# Patient Record
Sex: FEMALE | Race: WHITE | NOT HISPANIC OR LATINO | Employment: UNEMPLOYED | URBAN - METROPOLITAN AREA
[De-identification: names, ages, dates, MRNs, and addresses within clinical notes are randomized per-mention and may not be internally consistent; named-entity substitution may affect disease eponyms.]

---

## 2021-10-19 PROBLEM — Z80.3 FAMILY HISTORY OF BREAST CANCER IN FEMALE: Status: ACTIVE | Noted: 2021-10-19

## 2022-08-01 ENCOUNTER — CLINICAL SUPPORT (OUTPATIENT)
Dept: AUDIOLOGY | Facility: CLINIC | Age: 77
End: 2022-08-01
Payer: MEDICARE

## 2022-08-01 ENCOUNTER — OFFICE VISIT (OUTPATIENT)
Dept: OTOLARYNGOLOGY | Facility: CLINIC | Age: 77
End: 2022-08-01
Payer: MEDICARE

## 2022-08-01 VITALS — TEMPERATURE: 98 F | HEART RATE: 70 BPM | DIASTOLIC BLOOD PRESSURE: 84 MMHG | SYSTOLIC BLOOD PRESSURE: 125 MMHG

## 2022-08-01 DIAGNOSIS — H90.12 CONDUCTIVE HEARING LOSS OF LEFT EAR WITH UNRESTRICTED HEARING OF RIGHT EAR: Primary | ICD-10-CM

## 2022-08-01 DIAGNOSIS — H90.12 CONDUCTIVE HEARING LOSS OF LEFT EAR WITH UNRESTRICTED HEARING OF RIGHT EAR: ICD-10-CM

## 2022-08-01 DIAGNOSIS — H69.92 DYSFUNCTION OF LEFT EUSTACHIAN TUBE: ICD-10-CM

## 2022-08-01 DIAGNOSIS — R42 DIZZINESS: ICD-10-CM

## 2022-08-01 PROCEDURE — 99203 PR OFFICE/OUTPT VISIT, NEW, LEVL III, 30-44 MIN: ICD-10-PCS | Mod: S$PBB,,, | Performed by: OTOLARYNGOLOGY

## 2022-08-01 PROCEDURE — 99999 PR PBB SHADOW E&M-EST. PATIENT-LVL IV: CPT | Mod: PBBFAC,,, | Performed by: OTOLARYNGOLOGY

## 2022-08-01 PROCEDURE — 92553 AUDIOMETRY AIR & BONE: CPT | Mod: PBBFAC | Performed by: AUDIOLOGIST-HEARING AID FITTER

## 2022-08-01 PROCEDURE — 99999 PR PBB SHADOW E&M-EST. PATIENT-LVL I: ICD-10-PCS | Mod: PBBFAC,,, | Performed by: AUDIOLOGIST-HEARING AID FITTER

## 2022-08-01 PROCEDURE — 99999 PR PBB SHADOW E&M-EST. PATIENT-LVL IV: ICD-10-PCS | Mod: PBBFAC,,, | Performed by: OTOLARYNGOLOGY

## 2022-08-01 PROCEDURE — 99211 OFF/OP EST MAY X REQ PHY/QHP: CPT | Mod: PBBFAC,27 | Performed by: AUDIOLOGIST-HEARING AID FITTER

## 2022-08-01 PROCEDURE — 99214 OFFICE O/P EST MOD 30 MIN: CPT | Mod: PBBFAC,25 | Performed by: OTOLARYNGOLOGY

## 2022-08-01 PROCEDURE — 99999 PR PBB SHADOW E&M-EST. PATIENT-LVL I: CPT | Mod: PBBFAC,,, | Performed by: AUDIOLOGIST-HEARING AID FITTER

## 2022-08-01 PROCEDURE — 92567 TYMPANOMETRY: CPT | Mod: PBBFAC | Performed by: AUDIOLOGIST-HEARING AID FITTER

## 2022-08-01 PROCEDURE — 99203 OFFICE O/P NEW LOW 30 MIN: CPT | Mod: S$PBB,,, | Performed by: OTOLARYNGOLOGY

## 2022-08-01 NOTE — PROGRESS NOTES
Referring Provider:    Aaareferral Self  No address on file  Subjective:   Patient: Mallory Castro [id number], :1945   Visit date:2022 11:07 AM    Chief Complaint:  Dizziness (Onset late February, comes and goes intensity ebbs and flows )    HPI:    Prior notes reviewed by myself.  Clinical documentation obtained by nursing staff reviewed.     76-year-old female presents for evaluation of dizziness.  She has been having episodes intermittently since February, mostly when she turns her head rapidly, she describes these episodes as spinning vertigo that lasts for less than 15-20 seconds.  She has not noticed any significant changes in her hearing although she does have frequent ear popping on the left side.  She denies any tinnitus.  She does not have any other significant otologic history.  She did have an audiogram today      Objective:     Physical Exam:  Vitals:  /84   Pulse 70   Temp 97.5 °F (36.4 °C) (Temporal)   General appearance:  Well developed, well nourished    Ears:  Otoscopy of external auditory canals and tympanic membranes was normal, clinical speech reception thresholds grossly intact, no mass/lesion of auricle.    Nose:  No masses/lesions of external nose, nasal mucosa, septum, and turbinates were within normal limits.    Mouth:  No mass/lesion of lips, teeth, gums, hard/soft palate, tongue, tonsils, or oropharynx.    Neck & Lymphatics:  No cervical lymphadenopathy, no neck mass/crepitus/ asymmetry, trachea is midline, no thyroid enlargement/tenderness/mass.    Neuro:  CN II-XII intact bilaterally, negative hallpike bilaterally        [x]  Data Reviewed:    Lab Results   Component Value Date    WBC 6.26 2013    HGB 14.8 2013    HCT 43.4 2013    MCV 90 2013    EOSINOPHIL 1.1 2013           Assessment & Plan:   Conductive hearing loss of left ear with unrestricted hearing of right ear  -     Ambulatory referral/consult to Audiology; Future; Expected  date: 08/08/2022    Dizziness  -     Ambulatory referral/consult to Physical/Occupational Therapy; Future; Expected date: 08/08/2022    Dysfunction of left eustachian tube        Her Hallpike test was negative in clinic today, but her history is extremely suspicious for BP.  She had an episode as recent as yesterday.  We also discussed her audiogram in detail and her eustachian tube dysfunction.  We agreed on a referral to vestibular therapy for her recurrent dizziness.    Conservative Treatment of Eustachian Tube Dysfunction    Your physician has diagnosed you with eustachian tube dysfunction.  There are several conservative medical treatments that have been shown to help improve the function of your eustachian tube.    1. Topical Decongestant Spray (Afrin):  2 sprays in each nostril twice daily for 3 days.  It is important to stop this medication after 3 days as it can be habit-forming.  Buy the Afrin pump spray mist.  This usually comes in a red and white box over the counter.  2. Autoinsufflation (popping the ears):  Recommend gentle popping of the ears 3-4 times per day and as needed for symptom relief.  3. Oral Steroids:  Your physician may or may not recommend a short course of oral steroids.  If so, take as directed.  These help decrease the inflammation in your nasal/sinus cavity as well as around your eustachian tube which may help with your symptoms  4. Nasal Steroids:  Most commonly fluticasone 2 sprays in each nostril once daily.  This will help decrease the inflammation in your nasal/sinus cavity as well as around your eustachian tube which may help with your symptoms    If no improvement after 2 weeks of conservative treatment, your physician may recommend a myringotomy and or ear tube placement for symptom relief.

## 2022-08-01 NOTE — PROGRESS NOTES
Referring Provider:MD Sundeep    Mallory Castro was seen 08/01/2022 for an audiological evaluation.  She has been having episodes intermittently since February, mostly when she turns her head rapidly, she describes these episodes as spinning vertigo that lasts for less than 15-20 seconds.  She has not noticed any significant changes in her hearing although she does have frequent ear popping on the left side.  She denies any tinnitus.  She does not have any other significant otologic history.     Results reveal normal hearing from 250-8000 Hz for the right ear, and a mild conductive hearing loss 250-8000 Hz for the left ear.  Tympanograms were Type A, normal for the right ear and Type C, abnormal for the left ear.    Patient was counseled on the above findings.    Recommendations:  1. ENT review  2. Annual Audiograms    Boomer Hallpike Negative AU        Recommendations:  1. VNG

## 2022-08-12 ENCOUNTER — CLINICAL SUPPORT (OUTPATIENT)
Dept: REHABILITATION | Facility: HOSPITAL | Age: 77
End: 2022-08-12
Attending: OTOLARYNGOLOGY
Payer: MEDICARE

## 2022-08-12 DIAGNOSIS — R42 DIZZINESS: ICD-10-CM

## 2022-08-12 PROCEDURE — 95992 CANALITH REPOSITIONING PROC: CPT

## 2022-08-12 PROCEDURE — 97161 PT EVAL LOW COMPLEX 20 MIN: CPT

## 2022-08-12 NOTE — PLAN OF CARE
[facility]   Physical Therapy Initial Evaluation     Date: 8/12/2022   Name: Mallory Castro  Clinic Number: [id number]    Therapy Diagnosis:   Encounter Diagnosis   Name Primary?    Dizziness      Physician: Olivier Urbano MD    Physician Orders: PT Eval and Treat   Medical Diagnosis from Referral: Dizziness  Evaluation Date: 8/12/2022  Authorization Period Expiration: 8/1/2023  Plan of Care Expiration: 10/20/2022  Progress Note Due: 10th visit  Visit # / Visits authorized: 1/ 20   FOTO: 1/ 3     Precautions: Standard and Fall    Time In: 9:45  Time Out: 10:40  Total Appointment Time (timed & untimed codes): 45 minutes    SUBJECTIVE   Date of onset: February/March 2022 on/off episodes     History of current condition - Mallory reports:     She has been having episodes intermittently since February, mostly when she turns her head rapidly, she describes these episodes as spinning vertigo that lasts for less than 15-20 seconds.       Falls: multiple falls in the past 6 months    Imaging, VNG: neg Gayle Hallpike    Prior Therapy: yes: ortho  Social History:  lives with their spouse  Occupation: not working  Prior Level of Function: mod I  Current Level of Function: mod I    Dizziness:  Current 4/10, worst 10/10, best 1/10   Description: room spinning and unsteadiness  Aggravating Factors: Bending, Lifting and Getting out of bed/chair  Easing Factors: relaxation    Patients goals: decreased dizziness     Medical History:   Past Medical History:   Diagnosis Date    Allergy     Anxiety     Arthritis     Cataract     Dry eyes     Dry mouth     Endometriosis     Family history of breast cancer 02/19/2016    Fibromyalgia 2000    Genetic testing 06/08/2017    BRAC NEGATIVE  TCRA 6.6 % if menopausal at 44 8.1% if at 53 yo.    Herpes     Hyperlipidemia     Hypertension     Hypothyroidism     Neuromuscular disorder     Osteoarthritis 2003    Osteoporosis     Sepsis 2012    Thyroid  disease        Surgical History:   Mallory Castro  has a past surgical history that includes Joint replacement (08/2013); Appendectomy; Cholecystectomy; Foot surgery; left ankle surgery; Bunionectomy (Right, 2007); Cataract extraction, bilateral; Colonoscopy; Wrist surgery (2006); Hand tendon surgery (2014); Total hip arthroplasty; Sinus surgery (1978); Shoulder surgery; Hysterectomy (1987); and Breast cyst aspiration (Left, 1978).    Medications:   Mallory has a current medication list which includes the following prescription(s): acetaminophen, acyclovir, amlodipine, aspirin, b complex vitamins, clonazepam, ergocalciferol, estradiol 0.05 mg/24 hr td ptsw, levothyroxine, meloxicam, metformin, metoprolol tartrate, sertraline, trazodone, trazodone, and triamterene-hydrochlorothiazide 37.5-25 mg.    Allergies:   Review of patient's allergies indicates:   Allergen Reactions    Amitriptyline      Other reaction(s): nightmares    Codeine      Other reaction(s): Stomach upset    Codeine phosphate     Prednisone      Other reaction(s): mental unstability          OBJECTIVE         CMS Impairment/Limitation/Restriction for FOTO Vertigo Survey    Therapist reviewed FOTO scores for Mallory Castro on 8/12/2022.   FOTO documents entered into [employer] - see Media section.    Limitation Score: 52%            Vestibular Testing  Visual Fields: NT  Horizontal tracking: abnormal, hypofunction poor accuracy s/s  Vertical Tracking: abnormal, hypofunction poor accuracy s/s  Diagonal tracking:  Abnormal, hypofunction poor accuracy s/s  Lateral Visual acuity: NT  Dynamic Visual Acuity: NT  Vestibular Occular Reflex   Vertical: abnormal , hypofunction s/s   Horizontal: abnormal, hypofunction s/s  Convergence: abnormal (15-20cm)  [place] Halpike:  Neg nystagmus B s/s L epely maneuver  Saccades: abnormal, hypofunction poor speed and accuracy V/H    Vestibular:     Head impulse:  NT Nystagmus   Head shake:  NT nystagmus   Gayle Hallpike  R Neg  nystagmus, + s/s, L neg nystagmus +s/s   Roll test R NT, L NT    Possible cupulithesis    Balance testing: NT--> will test in future visit  Double limb standing eyes open/ eyes closed:   Single limb standing eyes open/ eyes closed:   Double limb standing on foam eyes open/ closed:    Single limb standign on foam eyes open/ closed:   Tandem gait:   Tandem stance:   Gait eyes closed:         TREATMENT     Total Treatment time (time-based codes) separate from Evaluation: 10 minutes      Mallory received the treatments listed below:       NEUROMUSCULAR RE-EDUCATION ACTIVITIES to improve Kinesthetic, Sense, Proprioception and canalith repositioning for 10 minutes.  The following were included: left epley manuever.    PATIENT EDUCATION AND HOME EXERCISES     Education provided:   - yes: VOR, smooth pursuit    Written Home Exercises Provided: yes. Exercises were reviewed and Mallory was able to demonstrate them prior to the end of the session.  Malloyr demonstrated good  understanding of the education provided. See EMR under Patient Instructions for exercises provided during therapy sessions.    ASSESSMENT     Mallory is a 76 y.o. female referred to outpatient Physical Therapy with a medical diagnosis of Vertigo/dizziness, pt presents with signs and symptoms consistent with diagnosis along with impaired balance and convergence insuffiencey . The patient presents with impairments which include weakness, impaired endurance, impaired self care skills, impaired functional mobility, gait instability, impaired balance, abnormal tone and decreased ROM.  These impairments are limiting patient's ability to perform bed mobility and transfers and perform household chores such as cooking and cleaning.    Pt prognosis is Good due to personal factors and co-morbidities listed below.   Pt will benefit from skilled outpatient Physical Therapy to address the deficits stated above and in the chart below, provide pt/family education, and to  maximize pt's level of independence.     Plan of care discussed with patient: Yes  Pt's spiritual, cultural and educational needs considered and patient is agreeable to the plan of care and goals as stated below:     Anticipated Barriers for therapy: transportation     Medical Necessity is demonstrated by the following  History  Co-morbidities and personal factors that may impact the plan of care Co-morbidities:   advanced age, anxiety, depression and difficulty sleeping    Personal Factors:   lifestyle     moderate   Examination  Body Structures and Functions, activity limitations and participation restrictions that may impact the plan of care Body Regions:   head    Body Systems:    balance  gait  joint mobility, muscle tone, muscle length    Participation Restrictions:   See current level of function listed above     Activity limitations:   Learning and applying knowledge  no deficits    General Tasks and Commands  no deficits    Communication  no deficits    Mobility  lifting and carrying objects  walking    Self care  washing oneself (bathing, drying, washing hands)    Domestic Life  shopping  cooking  doing house work (cleaning house, washing dishes, laundry)    Interactions/Relationships  no deficits    Life Areas  no deficits    Community and Social Life  community life  recreation and leisure         low   Clinical Presentation stable and uncomplicated low   Decision Making/ Complexity Score: low     Goals: Reviewed:8/12/2022    Short Term Goals: In 6 weeks   1.Pt to be educated on HEP.  2. Pt to be able to perform bed mobility, such as rolling over in bed without increase in s/s.  3.Patient to have dizziness less than 7/10 at worst, in order to improve QOL.  4.Patient to improve score on the FOTO, in order to improve QOL.       Long Term Goals: In 10 weeks  1. Patient to improve score on the FOTO to 40% or less, in order to improve QOL.  2. Patient to perform daily activities including bending to the floor  and reaching to a high cabinet in order to perform household chores without increased symptoms.  3. Patient to have dizziness less than 4/10 at worst, in order to improve QOL.    PLAN     Plan of care Certification: 8/12/2022 to 10/20/2022.    Outpatient Physical Therapy 2 times weekly for 10 weeks to include the following interventions: Cervical/Lumbar Traction, Electrical Stimulation cerviccal, Gait Training, Manual Therapy, Moist Heat/ Ice, Neuromuscular Re-ed, Orthotic Management and Training, Patient Education, Self Care, Therapeutic Activities, Therapeutic Exercise and canalith repositoning.     Alexus Zimmerman, PT      I CERTIFY THE NEED FOR THESE SERVICES FURNISHED UNDER THIS PLAN OF TREATMENT AND WHILE UNDER MY CARE   Physician's comments:     Physician's Signature: ___________________________________________________

## 2022-08-24 ENCOUNTER — CLINICAL SUPPORT (OUTPATIENT)
Dept: REHABILITATION | Facility: HOSPITAL | Age: 77
End: 2022-08-24
Payer: MEDICARE

## 2022-08-24 DIAGNOSIS — R42 DIZZINESS: Primary | ICD-10-CM

## 2022-08-24 PROCEDURE — 95992 CANALITH REPOSITIONING PROC: CPT

## 2022-08-24 PROCEDURE — 97112 NEUROMUSCULAR REEDUCATION: CPT | Mod: 59

## 2022-08-24 NOTE — PROGRESS NOTES
[facility]   Physical Therapy Treatment Note     Name: Mallory Castro  Clinic Number: [id number]    Therapy Diagnosis:   Encounter Diagnosis   Name Primary?    Dizziness Yes     Physician: Olivier Urabno MD    Visit Date: 8/24/2022    [Physician Orders: PT Eval and Treat   Medical Diagnosis from Referral: Dizziness  Evaluation Date: 8/12/2022  Authorization Period Expiration: 8/1/2023  Plan of Care Expiration: 10/20/2022  Progress Note Due: 10th visit  Visit # / Visits authorized: 2/ 20   FOTO: 1/ 3      PTA Visit #: 0/5     Time In: 7:30  Time Out: 8:30  Total Billable Time: 55 minutes    SUBJECTIVE     Pt reports: she remains dizzy with head motions. She felt nauseated last night after looking right to left repeatedly at paperwork.   She was compliant with home exercise program.  Response to previous treatment: good  Functional change: AN    Dizziness: 4/10  Location: bilateral head      OBJECTIVE     Objective Measures updated at progress report unless specified.     Treatment     Mallory received the treatments listed below:      neuromuscular re-education activities to improve: Balance, Coordination, Kinesthetic, Sense, Proprioception, Posture and canalith repositioning for 55 minutes. The following activities were included:  Left Epley Maneuver (02207)  Indication: Dizziness, Positive Gayle-Hallpike Maneuver  Technique: After the procedure was explained at length to the patient and verbal consent was obtained, the patient's head was turned to the left while sitting upright. The patient was then brought to the supine position with head turned to the left and slightly extended, and remained in that position for 1 minute. The patient's head was then turned all the way to the right for 1 minute. At that point the patient was turned to right lateral decubitus position for 1 minute. The patient was then brought back to the upright seated position. The patient tolerated the procedure well,  and was instructed to remain upright for the next 48 hours.   Left BBQ roll for left horizontal canal correction        Patient Education and Home Exercises     Home Exercises Provided and Patient Education Provided     Education provided:   - yes: saccades and smooth pursuit    Written Home Exercises Provided: Patient instructed to cont prior HEP. Exercises were reviewed and Mallory was able to demonstrate them prior to the end of the session.  Mallory demonstrated good  understanding of the education provided. See EMR under Patient Instructions for exercises provided during therapy sessions    ASSESSMENT     Pt tolerated session well with limited tolerance to VRT. Pt did have nausea post head motion and eye motions in horizontal plane. Room spinning dizziness reported with left [place] Hallpike and horizontal canal assessment. Both Epley and BBQ roll completed for posterior and horiz canal correction. Pt may need Libratory maneuver to address possible cupulolithiasis.      Mallory Is progressing well towards her goals.   Pt prognosis is Good.     Pt will continue to benefit from skilled outpatient physical therapy to address the deficits listed in the problem list box on initial evaluation, provide pt/family education and to maximize pt's level of independence in the home and community environment.     Pt's spiritual, cultural and educational needs considered and pt agreeable to plan of care and goals.     Anticipated barriers to physical therapy: none    Goals:    Goals: Reviewed:8/12/2022    Short Term Goals: In 6 weeks   1.Pt to be educated on HEP.  2. Pt to be able to perform bed mobility, such as rolling over in bed without increase in s/s.  3.Patient to have dizziness less than 7/10 at worst, in order to improve QOL.  4.Patient to improve score on the FOTO, in order to improve QOL.         Long Term Goals: In 10 weeks  1. Patient to improve score on the FOTO to 40% or less, in order to improve QOL.  2. Patient to  perform daily activities including bending to the floor and reaching to a high cabinet in order to perform household chores without increased symptoms.  3. Patient to have dizziness less than 4/10 at worst, in order to improve QOL.     PLAN      Plan of care Certification: 8/12/2022 to 10/20/2022.     Outpatient Physical Therapy 2 times weekly for 10 weeks to include the following interventions: Cervical/Lumbar Traction, Electrical Stimulation cerviccal, Gait Training, Manual Therapy, Moist Heat/ Ice, Neuromuscular Re-ed, Orthotic Management and Training, Patient Education, Self Care, Therapeutic Activities, Therapeutic Exercise and canalith repositoning.                Alexus Zimmerman, PT

## 2022-08-29 ENCOUNTER — CLINICAL SUPPORT (OUTPATIENT)
Dept: REHABILITATION | Facility: HOSPITAL | Age: 77
End: 2022-08-29
Payer: MEDICARE

## 2022-08-29 DIAGNOSIS — R42 DIZZINESS: Primary | ICD-10-CM

## 2022-08-29 PROCEDURE — 97112 NEUROMUSCULAR REEDUCATION: CPT

## 2022-08-29 NOTE — PROGRESS NOTES
"[facility]   Physical Therapy Treatment Note     Name: Mallory Castro  Clinic Number: [id number]    Therapy Diagnosis:   Encounter Diagnosis   Name Primary?    Dizziness Yes     Physician: Olivier Urbano MD    Visit Date: 8/29/2022    [Physician Orders: PT Eval and Treat   Medical Diagnosis from Referral: Dizziness  Evaluation Date: 8/12/2022  Authorization Period Expiration: 8/1/2023  Plan of Care Expiration: 10/20/2022  Progress Note Due: 10th visit  Visit # / Visits authorized: 3/ 20   FOTO: 1/ 3      PTA Visit #: 0/5     Time In: 9:00  Time Out: 9:45  Total Billable Time: 45 minutes    SUBJECTIVE     Pt reports: she remains dizzy with head motions. She reports falling into closet after looking upward at high shelf for awhile.   She was compliant with home exercise program.  Response to previous treatment: good  Functional change: AN    Dizziness: 4/10  Location: bilateral head      OBJECTIVE     Objective Measures updated at progress report unless specified.     Treatment     Mallory received the treatments listed below:      neuromuscular re-education activities to improve: Balance, Coordination, Kinesthetic, Sense, Proprioception, Posture and canalith repositioning for 45 minutes. The following activities were included:  Seated and standing x1 eye ex 20" 3x ea  Smooth pursuit seated and standing 2x30"  Seated vor cancellation 1x10  Ankle st at wall  Heel rocks  Reverse stepping for fall prevention   EC static 30"  EC with small perturbations by PT for reverse step strategy  Standing head motion 1x10 ea way 7/10 dizziness  Gayle hallpike: neg  Horiz canal: neg      Patient Education and Home Exercises     Home Exercises Provided and Patient Education Provided     Education provided:   - yes: saccades and smooth pursuit    Written Home Exercises Provided: Patient instructed to cont prior HEP. Exercises were reviewed and Mallory was able to demonstrate them prior to the end of the session. "  Mallory demonstrated good  understanding of the education provided. See EMR under Patient Instructions for exercises provided during therapy sessions    ASSESSMENT     Pt tolerated session well with improved tolerance to VRT. Pt did have nausea post head motion and eye motions with vertical head motion especially with cervical extension. Instructed pt in seated x1 ex with fingertip for compliance and initiated VOR cancellation with cervical extension. Poor ankle strategy and limited reaction with posterior lean causing fall and LOB in backward direction. Instructed pt in reverse stepping  to prevent LOB.     Mallory Is progressing well towards her goals.   Pt prognosis is Good.     Pt will continue to benefit from skilled outpatient physical therapy to address the deficits listed in the problem list box on initial evaluation, provide pt/family education and to maximize pt's level of independence in the home and community environment.     Pt's spiritual, cultural and educational needs considered and pt agreeable to plan of care and goals.     Anticipated barriers to physical therapy: none    Goals:    Goals: Reviewed:8/12/2022    Short Term Goals: In 6 weeks   1.Pt to be educated on HEP.  2. Pt to be able to perform bed mobility, such as rolling over in bed without increase in s/s.  3.Patient to have dizziness less than 7/10 at worst, in order to improve QOL.  4.Patient to improve score on the FOTO, in order to improve QOL.         Long Term Goals: In 10 weeks  1. Patient to improve score on the FOTO to 40% or less, in order to improve QOL.  2. Patient to perform daily activities including bending to the floor and reaching to a high cabinet in order to perform household chores without increased symptoms.  3. Patient to have dizziness less than 4/10 at worst, in order to improve QOL.     PLAN      Plan of care Certification: 8/12/2022 to 10/20/2022.     Outpatient Physical Therapy 2 times weekly for 10 weeks to include  the following interventions: Cervical/Lumbar Traction, Electrical Stimulation cerviccal, Gait Training, Manual Therapy, Moist Heat/ Ice, Neuromuscular Re-ed, Orthotic Management and Training, Patient Education, Self Care, Therapeutic Activities, Therapeutic Exercise and canalith repositoning.                Alexus Zimmerman, PT

## 2022-08-31 ENCOUNTER — CLINICAL SUPPORT (OUTPATIENT)
Dept: REHABILITATION | Facility: HOSPITAL | Age: 77
End: 2022-08-31
Payer: MEDICARE

## 2022-08-31 DIAGNOSIS — R42 DIZZINESS: Primary | ICD-10-CM

## 2022-08-31 PROCEDURE — 97112 NEUROMUSCULAR REEDUCATION: CPT

## 2022-08-31 PROCEDURE — 95992 CANALITH REPOSITIONING PROC: CPT

## 2022-08-31 NOTE — PROGRESS NOTES
"[facility]   Physical Therapy Treatment Note     Name: Mallory Castro  Clinic Number: [id number]    Therapy Diagnosis:   Encounter Diagnosis   Name Primary?    Dizziness Yes     Physician: Olivier Urbano MD    Visit Date: 8/31/2022    [Physician Orders: PT Eval and Treat   Medical Diagnosis from Referral: Dizziness  Evaluation Date: 8/12/2022  Authorization Period Expiration: 8/1/2023  Plan of Care Expiration: 10/20/2022  Progress Note Due: 10th visit  Visit # / Visits authorized: 4/ 20   FOTO: 1/ 3      PTA Visit #: 0/5     Time In: 9:00  Time Out: 9:45  Total Billable Time: 45 minutes    SUBJECTIVE     Pt reports: feeling slightly better yesterday but did have one episode after looking upward    She was compliant with home exercise program.  Response to previous treatment: good  Functional change: AN    Dizziness: 4/10  Location: bilateral head      OBJECTIVE     Objective Measures updated at progress report unless specified.     Treatment     Mallory received the treatments listed below:      neuromuscular re-education activities to improve: Balance, Coordination, Kinesthetic, Sense, Proprioception, Posture and canalith repositioning for 45 minutes. The following activities were included:  Seated ax1 eye ex 20" 3x ea  SSeated vor cancellation 1x10  Standing head motion 1x10 ea way 7/10 dizziness  [place] hallpike: right rotatory nystagmus   Horiz canal: right libratory       Patient Education and Home Exercises     Home Exercises Provided and Patient Education Provided     Education provided:   - yes: saccades and smooth pursuit    Written Home Exercises Provided: Patient instructed to cont prior HEP. Exercises were reviewed and Mallory was able to demonstrate them prior to the end of the session.  Mallory demonstrated good  understanding of the education provided. See EMR under Patient Instructions for exercises provided during therapy sessions    ASSESSMENT     Pt tolerated session well " with improved tolerance to VRT. Canal assessment completed with notable right rotatory nystagmus during [place] Hallpike. Right Libratory maneuver performed to address possible cupulolithiasis.    Poor ankle strategy and limited reaction with posterior lean causing fall and LOB in backward direction. Instructed pt in reverse stepping  to prevent LOB.     Mallory Is progressing well towards her goals.   Pt prognosis is Good.     Pt will continue to benefit from skilled outpatient physical therapy to address the deficits listed in the problem list box on initial evaluation, provide pt/family education and to maximize pt's level of independence in the home and community environment.     Pt's spiritual, cultural and educational needs considered and pt agreeable to plan of care and goals.     Anticipated barriers to physical therapy: none    Goals:    Goals: Reviewed:8/12/2022    Short Term Goals: In 6 weeks   1.Pt to be educated on HEP.  2. Pt to be able to perform bed mobility, such as rolling over in bed without increase in s/s.  3.Patient to have dizziness less than 7/10 at worst, in order to improve QOL.  4.Patient to improve score on the FOTO, in order to improve QOL.         Long Term Goals: In 10 weeks  1. Patient to improve score on the FOTO to 40% or less, in order to improve QOL.  2. Patient to perform daily activities including bending to the floor and reaching to a high cabinet in order to perform household chores without increased symptoms.  3. Patient to have dizziness less than 4/10 at worst, in order to improve QOL.     PLAN      Plan of care Certification: 8/12/2022 to 10/20/2022.     Outpatient Physical Therapy 2 times weekly for 10 weeks to include the following interventions: Cervical/Lumbar Traction, Electrical Stimulation cerviccal, Gait Training, Manual Therapy, Moist Heat/ Ice, Neuromuscular Re-ed, Orthotic Management and Training, Patient Education, Self Care, Therapeutic Activities, Therapeutic  Exercise and canalith repositoning.                Alexus Zimmerman, PT

## 2022-09-06 ENCOUNTER — CLINICAL SUPPORT (OUTPATIENT)
Dept: REHABILITATION | Facility: HOSPITAL | Age: 77
End: 2022-09-06
Payer: MEDICARE

## 2022-09-06 DIAGNOSIS — R42 DIZZINESS: Primary | ICD-10-CM

## 2022-09-06 PROCEDURE — 97112 NEUROMUSCULAR REEDUCATION: CPT

## 2022-09-06 NOTE — PROGRESS NOTES
"[facility]   Physical Therapy Treatment Note     Name: Mallory Castro  Clinic Number: [id number]    Therapy Diagnosis:   Encounter Diagnosis   Name Primary?    Dizziness Yes     Physician: Olivier Urbano MD    Visit Date: 9/6/2022    [Physician Orders: PT Eval and Treat   Medical Diagnosis from Referral: Dizziness  Evaluation Date: 8/12/2022  Authorization Period Expiration: 8/1/2023  Plan of Care Expiration: 10/20/2022  Progress Note Due: 10th visit  Visit # / Visits authorized: 5/ 20   FOTO: 1/ 3      PTA Visit #: 0/5     Time In: 9:30  Time Out: 10:15  Total Billable Time: 45 minutes    SUBJECTIVE     Pt reports: feeling better today  She was compliant with home exercise program.  Response to previous treatment: good  Functional change: AN    Dizziness: 4/10  Location: bilateral head      OBJECTIVE     Objective Measures updated at progress report unless specified.     Treatment     Mallory received the treatments listed below:      neuromuscular re-education activities to improve: Balance, Coordination, Kinesthetic, Sense, Proprioception, Posture and canalith repositioning for 45 minutes. The following activities were included:  Seated 1 eye ex 20" 3x ea  SSeated vor cancellation 1x10  Heel rocks  Standing hip width stance with head turns horiz and vertical 20x  Head tilts 1x10, EC head tilts 1x10  Amb with quick head turns 2 laps  Amb with slow head turn full ROM 1 lap  Amb 3 step seq 2 laps  Reverse stepping for fall prevention   EC static 30"  EC with small perturbations by PT for reverse step strategy  Standing head motion 1x10 ea way 7/10 dizziness      Patient Education and Home Exercises     Home Exercises Provided and Patient Education Provided     Education provided:   - yes: saccades and smooth pursuit    Written Home Exercises Provided: Patient instructed to cont prior HEP. Exercises were reviewed and Mallory was able to demonstrate them prior to the end of the session.  " Mallory demonstrated good  understanding of the education provided. See EMR under Patient Instructions for exercises provided during therapy sessions    ASSESSMENT     Pt tolerated session well with improved tolerance to VRT. Initiated head nods and turns without stabilization. Poor ankle strategy and limited reaction with posterior lean causing fall and LOB in backward direction. Advanced to eyes closed with ankle strategy and reverse step strategy with limited control. Canal assessment next session.     Mallory Is progressing well towards her goals.   Pt prognosis is Good.     Pt will continue to benefit from skilled outpatient physical therapy to address the deficits listed in the problem list box on initial evaluation, provide pt/family education and to maximize pt's level of independence in the home and community environment.     Pt's spiritual, cultural and educational needs considered and pt agreeable to plan of care and goals.     Anticipated barriers to physical therapy: none    Goals:    Goals: Reviewed:8/12/2022    Short Term Goals: In 6 weeks   1.Pt to be educated on HEP.  2. Pt to be able to perform bed mobility, such as rolling over in bed without increase in s/s.  3.Patient to have dizziness less than 7/10 at worst, in order to improve QOL.  4.Patient to improve score on the FOTO, in order to improve QOL.         Long Term Goals: In 10 weeks  1. Patient to improve score on the FOTO to 40% or less, in order to improve QOL.  2. Patient to perform daily activities including bending to the floor and reaching to a high cabinet in order to perform household chores without increased symptoms.  3. Patient to have dizziness less than 4/10 at worst, in order to improve QOL.     PLAN      Plan of care Certification: 8/12/2022 to 10/20/2022.     Outpatient Physical Therapy 2 times weekly for 10 weeks to include the following interventions: Cervical/Lumbar Traction, Electrical Stimulation cerviccal, Gait Training,  Manual Therapy, Moist Heat/ Ice, Neuromuscular Re-ed, Orthotic Management and Training, Patient Education, Self Care, Therapeutic Activities, Therapeutic Exercise and canalith repositoning.                Alexus Zimmerman, PT

## 2022-09-08 ENCOUNTER — CLINICAL SUPPORT (OUTPATIENT)
Dept: REHABILITATION | Facility: HOSPITAL | Age: 77
End: 2022-09-08
Payer: MEDICARE

## 2022-09-08 DIAGNOSIS — R42 DIZZINESS: Primary | ICD-10-CM

## 2022-09-08 PROCEDURE — 97112 NEUROMUSCULAR REEDUCATION: CPT

## 2022-09-08 NOTE — PROGRESS NOTES
"[facility]   Physical Therapy Treatment Note     Name: Mallory Castro  Clinic Number: [id number]    Therapy Diagnosis:   Encounter Diagnosis   Name Primary?    Dizziness Yes     Physician: Olivier Urbano MD    Visit Date: 9/8/2022    [Physician Orders: PT Eval and Treat   Medical Diagnosis from Referral: Dizziness  Evaluation Date: 8/12/2022  Authorization Period Expiration: 8/1/2023  Plan of Care Expiration: 10/20/2022  Progress Note Due: 10th visit  Visit # / Visits authorized: 6/ 20   FOTO: 1/ 3      PTA Visit #: 0/5     Time In: 9:30  Time Out: 10:15  Total Billable Time: 45 minutes    SUBJECTIVE     Pt reports: feeling better today  She was compliant with home exercise program.  Response to previous treatment: good  Functional change: AN    Dizziness: 4/10  Location: bilateral head      OBJECTIVE     Objective Measures updated at progress report unless specified.     Treatment     Mallory received the treatments listed below:      neuromuscular re-education activities to improve: Balance, Coordination, Kinesthetic, Sense, Proprioception, Posture and canalith repositioning for 45 minutes. The following activities were included:   and standing 1 eye ex 20" 3x ea  Standing vor cancellation 1x10 and lateral with step out   Heel rocks with stab 1min   Standing hip width stance with head turns horiz and vertical 20x  Head tilts 1x10, EC head tilts 1x10  Amb with quick head turns 2 laps  Amb with slow head turn full ROM 1 lap  Amb 3 step seq 2 laps  Reverse stepping for fall prevention   Forward step with fall prevention EO/EC with perturbations   EC static 30"  EC with small perturbations by PT for reverse step strategy  Standing head motion 1x10 ea way 7/10 dizziness      Patient Education and Home Exercises     Home Exercises Provided and Patient Education Provided     Education provided:   - yes: saccades and smooth pursuit    Written Home Exercises Provided: Patient instructed to cont " prior HEP. Exercises were reviewed and Mallory was able to demonstrate them prior to the end of the session.  Mallory demonstrated good  understanding of the education provided. See EMR under Patient Instructions for exercises provided during therapy sessions    ASSESSMENT     Pt tolerated session well with improved tolerance to VRT. Initiated forward step strategy to Poor ankle strategy and limited reaction with posterior lean causing fall and LOB in backward direction. Advanced to eyes closed with ankle strategy and reverse step strategy with limited control.     Mallory Is progressing well towards her goals.   Pt prognosis is Good.     Pt will continue to benefit from skilled outpatient physical therapy to address the deficits listed in the problem list box on initial evaluation, provide pt/family education and to maximize pt's level of independence in the home and community environment.     Pt's spiritual, cultural and educational needs considered and pt agreeable to plan of care and goals.     Anticipated barriers to physical therapy: none    Goals:    Goals: Reviewed:8/12/2022    Short Term Goals: In 6 weeks   1.Pt to be educated on HEP.  2. Pt to be able to perform bed mobility, such as rolling over in bed without increase in s/s.  3.Patient to have dizziness less than 7/10 at worst, in order to improve QOL.  4.Patient to improve score on the FOTO, in order to improve QOL.         Long Term Goals: In 10 weeks  1. Patient to improve score on the FOTO to 40% or less, in order to improve QOL.  2. Patient to perform daily activities including bending to the floor and reaching to a high cabinet in order to perform household chores without increased symptoms.  3. Patient to have dizziness less than 4/10 at worst, in order to improve QOL.     PLAN      Plan of care Certification: 8/12/2022 to 10/20/2022.     Outpatient Physical Therapy 2 times weekly for 10 weeks to include the following interventions: Cervical/Lumbar  Traction, Electrical Stimulation cerviccal, Gait Training, Manual Therapy, Moist Heat/ Ice, Neuromuscular Re-ed, Orthotic Management and Training, Patient Education, Self Care, Therapeutic Activities, Therapeutic Exercise and canalith repositoning.                Alexus Zimmerman, PT

## 2022-09-12 ENCOUNTER — OFFICE VISIT (OUTPATIENT)
Dept: OTOLARYNGOLOGY | Facility: CLINIC | Age: 77
End: 2022-09-12
Payer: MEDICARE

## 2022-09-12 VITALS — TEMPERATURE: 98 F | SYSTOLIC BLOOD PRESSURE: 129 MMHG | HEART RATE: 69 BPM | DIASTOLIC BLOOD PRESSURE: 69 MMHG

## 2022-09-12 DIAGNOSIS — H90.12 CONDUCTIVE HEARING LOSS OF LEFT EAR WITH UNRESTRICTED HEARING OF RIGHT EAR: ICD-10-CM

## 2022-09-12 DIAGNOSIS — H69.92 DYSFUNCTION OF LEFT EUSTACHIAN TUBE: ICD-10-CM

## 2022-09-12 DIAGNOSIS — R42 DIZZINESS: Primary | ICD-10-CM

## 2022-09-12 PROCEDURE — 99213 PR OFFICE/OUTPT VISIT, EST, LEVL III, 20-29 MIN: ICD-10-PCS | Mod: S$PBB,,, | Performed by: OTOLARYNGOLOGY

## 2022-09-12 PROCEDURE — 99213 OFFICE O/P EST LOW 20 MIN: CPT | Mod: S$PBB,,, | Performed by: OTOLARYNGOLOGY

## 2022-09-12 PROCEDURE — 99999 PR PBB SHADOW E&M-EST. PATIENT-LVL III: ICD-10-PCS | Mod: PBBFAC,,, | Performed by: OTOLARYNGOLOGY

## 2022-09-12 PROCEDURE — 99213 OFFICE O/P EST LOW 20 MIN: CPT | Mod: PBBFAC | Performed by: OTOLARYNGOLOGY

## 2022-09-12 PROCEDURE — 99999 PR PBB SHADOW E&M-EST. PATIENT-LVL III: CPT | Mod: PBBFAC,,, | Performed by: OTOLARYNGOLOGY

## 2022-09-12 RX ORDER — ROSUVASTATIN CALCIUM 20 MG/1
20 TABLET, COATED ORAL DAILY
COMMUNITY

## 2022-09-12 NOTE — PROGRESS NOTES
Referring Provider:    No referring provider defined for this encounter.  Subjective:   Patient: Mallory Castro [id number], :1945   Visit date:2022 11:07 AM    Chief Complaint:  Dizziness (Pt states she is getting over dizziness, Friday she had left ear ache which is the ear she feels pressure in, seems to have passed now)    HPI:    Prior notes reviewed by myself.  Clinical documentation obtained by nursing staff reviewed.     76-year-old female presents for evaluation of dizziness.  She has been having episodes intermittently since February, mostly when she turns her head rapidly, she describes these episodes as spinning vertigo that lasts for less than 15-20 seconds.  She has not noticed any significant changes in her hearing although she does have frequent ear popping on the left side.  She denies any tinnitus.  She does not have any other significant otologic history.  She did have an audiogram today    22 update:  Dizziness resolved, discharged from PT      Objective:     Physical Exam:  Vitals:  /69   Pulse 69   Temp 97.7 °F (36.5 °C) (Temporal)   General appearance:  Well developed, well nourished    Ears:  Otoscopy of external auditory canals and tympanic membranes was normal, clinical speech reception thresholds grossly intact, no mass/lesion of auricle.    Nose:  No masses/lesions of external nose, nasal mucosa, septum, and turbinates were within normal limits.    Mouth:  No mass/lesion of lips, teeth, gums, hard/soft palate, tongue, tonsils, or oropharynx.    Neck & Lymphatics:  No cervical lymphadenopathy, no neck mass/crepitus/ asymmetry, trachea is midline, no thyroid enlargement/tenderness/mass.    Neuro:  CN II-XII intact bilaterally, negative hallpike bilaterally        [x]  Data Reviewed:    Lab Results   Component Value Date    WBC 6.26 2013    HGB 14.8 2013    HCT 43.4 2013    MCV 90 2013    EOSINOPHIL 1.1 2013      Media Information  File  Link    Annotation on 8/1/2022 11:35 AM by KURT Oliva: AUDIOGRAM        Key Information    Document ID File Type Document Type Description   [id number] Annotation Annotation AUDIOGRAM     Import Information    Attached At Date Time User Dept   Encounter Level 8/1/2022 11:35 AM KURT Oliva [facility] Audiology     Encounter    Clinical Support on 8/1/22 with KURT Oliva       Reviewed PT notes    Assessment & Plan:   Dizziness    Dysfunction of left eustachian tube    Conductive hearing loss of left ear with unrestricted hearing of right ear        Her Hallpike test was negative in clinic today, but her history is extremely suspicious for BP.  She had an episode as recent as yesterday.  We also discussed her audiogram in detail and her eustachian tube dysfunction.  We agreed on a referral to vestibular therapy for her recurrent dizziness.    9/12/22 update:  Patient feels that her issues have resolved.  Will continue with home exercises.  RTC PRN      Conservative Treatment of Eustachian Tube Dysfunction    Your physician has diagnosed you with eustachian tube dysfunction.  There are several conservative medical treatments that have been shown to help improve the function of your eustachian tube.    Topical Decongestant Spray (Afrin):  2 sprays in each nostril twice daily for 3 days.  It is important to stop this medication after 3 days as it can be habit-forming.  Buy the Afrin pump spray mist.  This usually comes in a red and white box over the counter.  Autoinsufflation (popping the ears):  Recommend gentle popping of the ears 3-4 times per day and as needed for symptom relief.  Oral Steroids:  Your physician may or may not recommend a short course of oral steroids.  If so, take as directed.  These help decrease the inflammation in your nasal/sinus cavity as well as around your eustachian tube which may help with your symptoms  Nasal Steroids:  Most commonly fluticasone 2 sprays in  each nostril once daily.  This will help decrease the inflammation in your nasal/sinus cavity as well as around your eustachian tube which may help with your symptoms    If no improvement after 2 weeks of conservative treatment, your physician may recommend a myringotomy and or ear tube placement for symptom relief.

## 2022-09-13 ENCOUNTER — CLINICAL SUPPORT (OUTPATIENT)
Dept: REHABILITATION | Facility: HOSPITAL | Age: 77
End: 2022-09-13
Payer: MEDICARE

## 2022-09-13 DIAGNOSIS — R42 DIZZINESS: Primary | ICD-10-CM

## 2022-09-13 PROCEDURE — 97112 NEUROMUSCULAR REEDUCATION: CPT

## 2022-09-13 NOTE — PROGRESS NOTES
"[facility]   Physical Therapy Treatment Note     Name: Mallory Castro  Clinic Number: [id number]    Therapy Diagnosis:   Encounter Diagnosis   Name Primary?    Dizziness Yes     Physician: Olivier Urbano MD    Visit Date: 9/13/2022    [Physician Orders: PT Eval and Treat   Medical Diagnosis from Referral: Dizziness  Evaluation Date: 8/12/2022  Authorization Period Expiration: 8/1/2023  Plan of Care Expiration: 10/20/2022  Progress Note Due: 10th visit  Visit # / Visits authorized: 7/ 20   FOTO: 2/ 3      PTA Visit #: 0/5     Time In: 9:30  Time Out: 10:15  Total Billable Time: 45 minutes    SUBJECTIVE     Pt reports: feeling better today  She was compliant with home exercise program.  Response to previous treatment: good  Functional change: AN    Dizziness: 4/10  Location: bilateral head      OBJECTIVE     Objective Measures updated at progress report unless specified.     Treatment     Mallory received the treatments listed below:      neuromuscular re-education activities to improve: Balance, Coordination, Kinesthetic, Sense, Proprioception, Posture and canalith repositioning for 45 minutes. The following activities were included:   standing 1 eye ex 20" 3x ea  Standing vor cancellation 1x10 and lateral with step out   Heel rocks with stab 1min   Standing narrow KE stance with head turns horiz and vertical 20x eyes open and closed 10x  Amb with quick head turns 2 laps  Amb with slow head turn full ROM 1 lap  Amb 3 step seq 2 laps  Reverse stepping for fall prevention   Forward step with fall prevention EO/EC with perturbations   EC static 30"  EC with small perturbations by PT for reverse step strategy  Standing head motion 1x10 ea way 7/10 dizziness      Patient Education and Home Exercises     Home Exercises Provided and Patient Education Provided     Education provided:   - yes: saccades and smooth pursuit    Written Home Exercises Provided: Patient instructed to cont prior HEP. " Exercises were reviewed and Mallory was able to demonstrate them prior to the end of the session.  Mallory demonstrated good  understanding of the education provided. See EMR under Patient Instructions for exercises provided during therapy sessions    ASSESSMENT     Pt tolerated session well with improved tolerance to VRT. Initiated forward step strategy for fall prevention and improved ankle strategy. Pt able to advance to eyes closed with head turns and narrow KE, minimal sway during horiz, and moderate with vertical. Limited reaction with posterior lean causing fall and LOB in backward direction. Advanced to eyes closed with ankle strategy and reverse step strategy with limited control.     Mallory Is progressing well towards her goals.   Pt prognosis is Good.     Pt will continue to benefit from skilled outpatient physical therapy to address the deficits listed in the problem list box on initial evaluation, provide pt/family education and to maximize pt's level of independence in the home and community environment.     Pt's spiritual, cultural and educational needs considered and pt agreeable to plan of care and goals.     Anticipated barriers to physical therapy: none    Goals:    Goals: Reviewed:8/12/2022    Short Term Goals: In 6 weeks   1.Pt to be educated on HEP.  2. Pt to be able to perform bed mobility, such as rolling over in bed without increase in s/s.  3.Patient to have dizziness less than 7/10 at worst, in order to improve QOL.  4.Patient to improve score on the FOTO, in order to improve QOL.         Long Term Goals: In 10 weeks  1. Patient to improve score on the FOTO to 40% or less, in order to improve QOL.  2. Patient to perform daily activities including bending to the floor and reaching to a high cabinet in order to perform household chores without increased symptoms.  3. Patient to have dizziness less than 4/10 at worst, in order to improve QOL.     PLAN      Plan of care Certification: 8/12/2022  to 10/20/2022.     Outpatient Physical Therapy 2 times weekly for 10 weeks to include the following interventions: Cervical/Lumbar Traction, Electrical Stimulation cerviccal, Gait Training, Manual Therapy, Moist Heat/ Ice, Neuromuscular Re-ed, Orthotic Management and Training, Patient Education, Self Care, Therapeutic Activities, Therapeutic Exercise and canalith repositoning.                Alexus Zimmerman, PT

## 2022-09-19 ENCOUNTER — CLINICAL SUPPORT (OUTPATIENT)
Dept: REHABILITATION | Facility: HOSPITAL | Age: 77
End: 2022-09-19
Payer: MEDICARE

## 2022-09-19 DIAGNOSIS — R42 DIZZINESS: Primary | ICD-10-CM

## 2022-09-19 PROCEDURE — 95992 CANALITH REPOSITIONING PROC: CPT

## 2022-09-19 PROCEDURE — 97112 NEUROMUSCULAR REEDUCATION: CPT

## 2022-09-19 NOTE — PROGRESS NOTES
"[facility]   Physical Therapy Treatment Note     Name: Mallory Castro  Clinic Number: [id number]    Therapy Diagnosis:   Encounter Diagnosis   Name Primary?    Dizziness Yes     Physician: Olivier Urbano MD    Visit Date: 9/19/2022    [Physician Orders: PT Eval and Treat   Medical Diagnosis from Referral: Dizziness  Evaluation Date: 8/12/2022  Authorization Period Expiration: 8/1/2023  Plan of Care Expiration: 10/20/2022  Progress Note Due: 10th visit  Visit # / Visits authorized: 8/ 20   FOTO: 2/ 3      PTA Visit #: 0/5     Time In: 9:30  Time Out: 10:15  Total Billable Time: 45 minutes    SUBJECTIVE     Pt reports: feeling better today  She was compliant with home exercise program.  Response to previous treatment: good  Functional change: AN    Dizziness: 4/10  Location: bilateral head      OBJECTIVE     Objective Measures updated at progress report unless specified.     Treatment     Mallory received the treatments listed below:      neuromuscular re-education activities to improve: Balance, Coordination, Kinesthetic, Sense, Proprioception, Posture and canalith repositioning for 45 minutes. The following activities were included:   standing 1 eye ex 20" 3x ea     Standing narrow KE stance with head turns horiz and vertical 20x eyes open and closed 10x  Amb with quick head turns 2 laps  Amb with slow head turn full ROM 1 lap  Amb 3 step seq 2 laps  Standing head motion 1x10 ea way 7/10 dizziness  [place] hallpike: right rotatory nystagmus followed by down beating  Right Epley maneuver  Anterior canal repositioning       Patient Education and Home Exercises     Home Exercises Provided and Patient Education Provided     Education provided:   - yes: saccades and smooth pursuit    Written Home Exercises Provided: Patient instructed to cont prior HEP. Exercises were reviewed and Mallory was able to demonstrate them prior to the end of the session.  Mallory demonstrated good  understanding of the " education provided. See EMR under Patient Instructions for exercises provided during therapy sessions    ASSESSMENT     Pt tolerated session well with improved tolerance to VRT. Noted left pull while completing EC activities and ambulation trials. CRM completed to address right rotatory nystagmus that was followed by down beating nystagmus. Right Epely and anterior canal repositioning completed with good results. Pt cont eyes closed with head turns and narrow KE, minimal sway during horiz, and moderate with vertical.     Mallory Is progressing well towards her goals.   Pt prognosis is Good.     Pt will continue to benefit from skilled outpatient physical therapy to address the deficits listed in the problem list box on initial evaluation, provide pt/family education and to maximize pt's level of independence in the home and community environment.     Pt's spiritual, cultural and educational needs considered and pt agreeable to plan of care and goals.     Anticipated barriers to physical therapy: none    Goals:    Goals: Reviewed:8/12/2022    Short Term Goals: In 6 weeks   1.Pt to be educated on HEP.  2. Pt to be able to perform bed mobility, such as rolling over in bed without increase in s/s.  3.Patient to have dizziness less than 7/10 at worst, in order to improve QOL.  4.Patient to improve score on the FOTO, in order to improve QOL.         Long Term Goals: In 10 weeks  1. Patient to improve score on the FOTO to 40% or less, in order to improve QOL.  2. Patient to perform daily activities including bending to the floor and reaching to a high cabinet in order to perform household chores without increased symptoms.  3. Patient to have dizziness less than 4/10 at worst, in order to improve QOL.     PLAN      Plan of care Certification: 8/12/2022 to 10/20/2022.     Outpatient Physical Therapy 2 times weekly for 10 weeks to include the following interventions: Cervical/Lumbar Traction, Electrical Stimulation  cerviccal, Gait Training, Manual Therapy, Moist Heat/ Ice, Neuromuscular Re-ed, Orthotic Management and Training, Patient Education, Self Care, Therapeutic Activities, Therapeutic Exercise and canalith repositoning.                Alexus Zimmerman, PT

## 2022-09-21 ENCOUNTER — CLINICAL SUPPORT (OUTPATIENT)
Dept: REHABILITATION | Facility: HOSPITAL | Age: 77
End: 2022-09-21
Payer: MEDICARE

## 2022-09-21 DIAGNOSIS — R42 DIZZINESS: Primary | ICD-10-CM

## 2022-09-21 PROCEDURE — 97112 NEUROMUSCULAR REEDUCATION: CPT

## 2022-09-21 NOTE — PROGRESS NOTES
"[facility]   Physical Therapy Treatment Note     Name: Mallory Castro  Clinic Number: [id number]    Therapy Diagnosis:   Encounter Diagnosis   Name Primary?    Dizziness Yes     Physician: Olivier Urbano MD    Visit Date: 9/21/2022    [Physician Orders: PT Eval and Treat   Medical Diagnosis from Referral: Dizziness  Evaluation Date: 8/12/2022  Authorization Period Expiration: 8/1/2023  Plan of Care Expiration: 10/20/2022  Progress Note Due: 10th visit  Visit # / Visits authorized: 9/ 20   FOTO: 2/ 3      PTA Visit #: 0/5     Time In: 9:00  Time Out: 10:00  Total Billable Time: 60 minutes    SUBJECTIVE     Pt reports: feeling drunk but less dizziness and LOB in home  She was compliant with home exercise program.  Response to previous treatment: good  Functional change: AN    Dizziness: 4/10  Location: bilateral head      OBJECTIVE     Objective Measures updated at progress report unless specified.     Treatment     Mallory received the treatments listed below:      neuromuscular re-education activities to improve: Balance, Coordination, Kinesthetic, Sense, Proprioception, Posture and canalith repositioning for 58 minutes. The following activities were included:   standing 1 eye ex 20" 3x ea  Standing vor cancellation 1x10 and lateral with step out   Ankle st at wall  Heel rocks with stab 1min   6" step taps with trunk and head rot to see playing card 1min 2x   6" step up with body rot to seek target (increased diff with recognize card suit)  Standing narrow KE stance with head turns horiz and vertical 20x eyes open and closed 10x  Amb with quick head turns 2 laps  Amb with slow head turn full ROM 1 lap  Amb 3 step seq 2 laps  everse stepping for fall prevention   Forward step with fall prevention EO/EC with perturbations   EC static 30"  EC with small perturbations by PT for reverse step strategy  Standing head motion 1x10 ea way 7/10 dizziness      Patient Education and Home Exercises "     Home Exercises Provided and Patient Education Provided     Education provided:   - yes: saccades and smooth pursuit    Written Home Exercises Provided: Patient instructed to cont prior HEP. Exercises were reviewed and Mallory was able to demonstrate them prior to the end of the session.  Mallory demonstrated good  understanding of the education provided. See EMR under Patient Instructions for exercises provided during therapy sessions    ASSESSMENT     Pt tolerated session well with notable fatigue. Progressed to step tap and step up with head and body rotation to seek target. Pt did have difficulty naming card at times due to attention to tasks issues and eye stabilization to small target. Pt cont to have LOB with eyes closed while head turns.     Mallory Is progressing well towards her goals.   Pt prognosis is Good.     Pt will continue to benefit from skilled outpatient physical therapy to address the deficits listed in the problem list box on initial evaluation, provide pt/family education and to maximize pt's level of independence in the home and community environment.     Pt's spiritual, cultural and educational needs considered and pt agreeable to plan of care and goals.     Anticipated barriers to physical therapy: none    Goals:    Goals: Reviewed:8/12/2022    Short Term Goals: In 6 weeks   1.Pt to be educated on HEP.  2. Pt to be able to perform bed mobility, such as rolling over in bed without increase in s/s.  3.Patient to have dizziness less than 7/10 at worst, in order to improve QOL.  4.Patient to improve score on the FOTO, in order to improve QOL.         Long Term Goals: In 10 weeks  1. Patient to improve score on the FOTO to 40% or less, in order to improve QOL.  2. Patient to perform daily activities including bending to the floor and reaching to a high cabinet in order to perform household chores without increased symptoms.  3. Patient to have dizziness less than 4/10 at worst, in order to  improve QOL.     PLAN      Plan of care Certification: 8/12/2022 to 10/20/2022.     Outpatient Physical Therapy 2 times weekly for 10 weeks to include the following interventions: Cervical/Lumbar Traction, Electrical Stimulation cerviccal, Gait Training, Manual Therapy, Moist Heat/ Ice, Neuromuscular Re-ed, Orthotic Management and Training, Patient Education, Self Care, Therapeutic Activities, Therapeutic Exercise and canalith repositoning.                Alexus Zimmerman, PT

## 2022-09-26 ENCOUNTER — CLINICAL SUPPORT (OUTPATIENT)
Dept: REHABILITATION | Facility: HOSPITAL | Age: 77
End: 2022-09-26
Payer: MEDICARE

## 2022-09-26 DIAGNOSIS — R42 DIZZINESS: Primary | ICD-10-CM

## 2022-09-26 PROCEDURE — 95992 CANALITH REPOSITIONING PROC: CPT

## 2022-09-26 PROCEDURE — 97112 NEUROMUSCULAR REEDUCATION: CPT

## 2022-09-26 NOTE — PROGRESS NOTES
"[facility]   Physical Therapy Treatment Note     Name: Mallory Castro  Clinic Number: [id number]    Therapy Diagnosis:   Encounter Diagnosis   Name Primary?    Dizziness Yes     Physician: Olivier Urbano MD    Visit Date: 9/26/2022    [Physician Orders: PT Eval and Treat   Medical Diagnosis from Referral: Dizziness  Evaluation Date: 8/12/2022  Authorization Period Expiration: 8/1/2023  Plan of Care Expiration: 12/2/2022  Progress Note Due: 10th visit, completed 9/26/2022  Visit # / Visits authorized: 10/ 20   FOTO: 2/ 3      PTA Visit #: 0/5     Time In: 9:00  Time Out: 10:00  Total Billable Time: 60 minutes    SUBJECTIVE     Pt reports: feeling better but always slightly unsteady   She was compliant with home exercise program.  Response to previous treatment: good  Functional change: AN    Dizziness: 4/10  Location: bilateral head      OBJECTIVE     Objective Measures updated at progress report unless specified.     [place] Hallpike: slight left upward rotating nystagmus   Possible cupuliothiasis: Semont maneuver performed on left   Improved tolerance and endurance with VOR gaze stabilization  Decreased saccadic interruptions noted during smooth pursuit training  Pt able to safely demo ankle strategy and reverse step strategy post posterior perturbation  Pt does cont to require CGA to Shanelle post anterior and alteral pertubation due to limited compensatory strategies.     Treatment     Mallory received the treatments listed below:      neuromuscular re-education activities to improve: Balance, Coordination, Kinesthetic, Sense, Proprioception, Posture and canalith repositioning for 58 minutes. The following activities were included:   standing 1 eye ex 20" 3x ea  Standing vor cancellation 1x10 and lateral with step out   Ankle st at wall  Heel rocks with stab 1min   6" step taps with trunk and head rot to see playing card 1min 2x   6" step up with body rot to seek target (increased diff with " "recognize card suit)  Standing narrow KE stance with head turns horiz and vertical 20x eyes open and closed 10x  Amb with quick head turns 2 laps  Amb with slow head turn full ROM 1 lap  Amb 3 step seq 2 laps  everse stepping for fall prevention   Forward step with fall prevention EO/EC with perturbations   EC static 30"  EC with small perturbations by PT for reverse step strategy  Standing head motion 1x10 ea way 7/10 dizziness      Patient Education and Home Exercises     Home Exercises Provided and Patient Education Provided     Education provided:   - yes: saccades and smooth pursuit    Written Home Exercises Provided: Patient instructed to cont prior HEP. Exercises were reviewed and Mallory was able to demonstrate them prior to the end of the session.  Mallory demonstrated good  understanding of the education provided. See EMR under Patient Instructions for exercises provided during therapy sessions    ASSESSMENT     Pt progressing well toward established LGT. She met 4/4 stg and demonstrates improved tolerance, accuracy, and endurance with adaptation training and reduced motion sensitivities. Pt does cont to have moderate fall risk due to delayed fall prevention strategies, as well as, cont visual dependence and vestibular weakness noted with LOB post ambulation with head turn tasks and tasks with eyes closed. Pt would benefit from cont vestibulare rehab to reduce fall risk and improve VOR hypofunction.     Mallory Is progressing well towards her goals.   Pt prognosis is Good.     Pt will continue to benefit from skilled outpatient physical therapy to address the deficits listed in the problem list box on initial evaluation, provide pt/family education and to maximize pt's level of independence in the home and community environment.     Pt's spiritual, cultural and educational needs considered and pt agreeable to plan of care and goals.     Anticipated barriers to physical therapy: none    Goals:    Goals: " Reviewed:8/12/2022    Short Term Goals: In 6 weeks   1.Pt to be educated on HEP. MET  2. Pt to be able to perform bed mobility, such as rolling over in bed without increase in s/s. MET  3.Patient to have dizziness less than 7/10 at worst, in order to improve QOL.MET  4.Patient to improve score on the FOTO, in order to improve QOL. MET        Long Term Goals: In 10 weeks  1. Patient to improve score on the FOTO to 40% or less, in order to improve QOL.PROGRESSING  2. Patient to perform daily activities including bending to the floor and reaching to a high cabinet in order to perform household chores without increased symptoms.PROGRESSING  3. Patient to have dizziness less than 4/10 at worst, in order to improve QOL.PROGRESSING     PLAN      Plan of care Certification: 9/26/2022 to 12/2/2022     Outpatient Physical Therapy 2 times weekly for 10 weeks to include the following interventions: Cervical/Lumbar Traction, Electrical Stimulation cerviccal, Gait Training, Manual Therapy, Moist Heat/ Ice, Neuromuscular Re-ed, Orthotic Management and Training, Patient Education, Self Care, Therapeutic Activities, Therapeutic Exercise and canalith repositoning.                Alexus Zimmerman, PT

## 2022-09-29 ENCOUNTER — CLINICAL SUPPORT (OUTPATIENT)
Dept: REHABILITATION | Facility: HOSPITAL | Age: 77
End: 2022-09-29
Payer: MEDICARE

## 2022-09-29 DIAGNOSIS — R42 DIZZINESS: Primary | ICD-10-CM

## 2022-09-29 PROCEDURE — 97112 NEUROMUSCULAR REEDUCATION: CPT

## 2022-09-29 NOTE — PROGRESS NOTES
"[facility]   Physical Therapy Treatment Note     Name: Mallory Castro  Clinic Number: [id number]    Therapy Diagnosis:   Encounter Diagnosis   Name Primary?    Dizziness Yes     Physician: Olivier Urbano MD    Visit Date: 9/29/2022    [Physician Orders: PT Eval and Treat   Medical Diagnosis from Referral: Dizziness  Evaluation Date: 8/12/2022  Authorization Period Expiration: 8/1/2023  Plan of Care Expiration: 12/2/2022  Progress Note Due: 10th visit, completed 9/26/2022  Visit # / Visits authorized: 11/ 20   FOTO: 2/ 3      PTA Visit #: 0/5     Time In: 10:15  Time Out: 11:00  Total Billable Time: 45 minutes    SUBJECTIVE     Pt reports: feeling better but always slightly unsteady   She was compliant with home exercise program.  Response to previous treatment: good  Functional change: improved balance    Dizziness: 2/10  Location: bilateral head      OBJECTIVE     Objective Measures updated at progress report unless specified.     [place] Hallpike: slight left upward rotating nystagmus   Possible cupuliothiasis: Semont maneuver performed on left   Improved tolerance and endurance with VOR gaze stabilization  Decreased saccadic interruptions noted during smooth pursuit training  Pt able to safely demo ankle strategy and reverse step strategy post posterior perturbation  Pt does cont to require CGA to Shanelle post anterior and alteral pertubation due to limited compensatory strategies.     Treatment     Mallory received the treatments listed below:      neuromuscular re-education activities to improve: Balance, Coordination, Kinesthetic, Sense, Proprioception, Posture and canalith repositioning for 58 minutes. The following activities were included:  Standing x1 eye ex 30" 3x ea quick and small/larger ROM and slower  Standing vor cancellation 1x10 and lateral with step out   Heel rocks with stab 1min   6Standing narrow KE stance with head turns horiz and vertical 20x eyes open and closed 10x  Amb " "with quick head turns 2 laps  Amb with slow head turn full ROM 1 lap  Amb 3 step seq 2 laps  everse stepping for fall prevention loaded with 10# 2 laps  Forward step with fall prevention EO/EC with perturbations   EC static 30"  EC with small perturbations by PT for reverse step strategy  Standing head motion 1x10 ea way 7/10 dizziness  Jumps and hops holding bar 2x5 ea  Layered tasks: WS fwd with CL fwd tap, and squat to floor 1x10  Wide squat eyes on 10# ball and stand 10x      Patient Education and Home Exercises     Home Exercises Provided and Patient Education Provided     Education provided:   - yes: saccades and smooth pursuit    Written Home Exercises Provided: Patient instructed to cont prior HEP. Exercises were reviewed and Mallory was able to demonstrate them prior to the end of the session.  Mallory demonstrated good  understanding of the education provided. See EMR under Patient Instructions for exercises provided during therapy sessions    ASSESSMENT     Pt progressing well. She was able to advance to layering tasks with weight shift for dynamic balance. Pt instructed in hopping and small self paced in place jumps with hands on bar for support. Pt reports no increase in s/s, Shanelle needed during multi directive tasks. Pt would benefit from cont vestibulare rehab to reduce fall risk and improve VOR hypofunction.     Mallory Is progressing well towards her goals.   Pt prognosis is Good.     Pt will continue to benefit from skilled outpatient physical therapy to address the deficits listed in the problem list box on initial evaluation, provide pt/family education and to maximize pt's level of independence in the home and community environment.     Pt's spiritual, cultural and educational needs considered and pt agreeable to plan of care and goals.     Anticipated barriers to physical therapy: none    Goals:    Goals: Reviewed:8/12/2022    Short Term Goals: In 6 weeks   1.Pt to be educated on HEP. MET  2. Pt to " be able to perform bed mobility, such as rolling over in bed without increase in s/s. MET  3.Patient to have dizziness less than 7/10 at worst, in order to improve QOL.MET  4.Patient to improve score on the FOTO, in order to improve QOL. MET        Long Term Goals: In 10 weeks  1. Patient to improve score on the FOTO to 40% or less, in order to improve QOL.PROGRESSING  2. Patient to perform daily activities including bending to the floor and reaching to a high cabinet in order to perform household chores without increased symptoms.PROGRESSING  3. Patient to have dizziness less than 4/10 at worst, in order to improve QOL.PROGRESSING     PLAN      Plan of care Certification: 9/26/2022 to 12/2/2022     Outpatient Physical Therapy 2 times weekly for 10 weeks to include the following interventions: Cervical/Lumbar Traction, Electrical Stimulation cerviccal, Gait Training, Manual Therapy, Moist Heat/ Ice, Neuromuscular Re-ed, Orthotic Management and Training, Patient Education, Self Care, Therapeutic Activities, Therapeutic Exercise and canalith repositoning.                Alexus Zimmerman, PT

## 2022-10-05 ENCOUNTER — CLINICAL SUPPORT (OUTPATIENT)
Dept: REHABILITATION | Facility: HOSPITAL | Age: 77
End: 2022-10-05
Payer: MEDICARE

## 2022-10-05 DIAGNOSIS — R42 DIZZINESS: Primary | ICD-10-CM

## 2022-10-05 PROCEDURE — 97112 NEUROMUSCULAR REEDUCATION: CPT

## 2022-10-05 NOTE — PROGRESS NOTES
"[facility]   Physical Therapy Treatment Note     Name: Mallory Castro  Clinic Number: [id number]    Therapy Diagnosis:   Encounter Diagnosis   Name Primary?    Dizziness Yes     Physician: Olivier Urbano MD    Visit Date: 10/5/2022    [Physician Orders: PT Eval and Treat   Medical Diagnosis from Referral: Dizziness  Evaluation Date: 8/12/2022  Authorization Period Expiration: 8/1/2023  Plan of Care Expiration: 12/2/2022  Progress Note Due: 10th visit, completed 9/26/2022  Visit # / Visits authorized: 12/ 20   FOTO: 2/ 3      PTA Visit #: 0/5     Time In: 9:00  Time Out: 9:45  Total Billable Time: 45 minutes    SUBJECTIVE     Pt reports: feeling better but always slightly unsteady   She was compliant with home exercise program.  Response to previous treatment: good  Functional change: improved balance    Dizziness: 2/10  Location: bilateral head      OBJECTIVE     Objective Measures updated at progress report unless specified.     [place] Hallpike: slight left upward rotating nystagmus   Possible cupuliothiasis: Semont maneuver performed on left   Improved tolerance and endurance with VOR gaze stabilization  Decreased saccadic interruptions noted during smooth pursuit training  Pt able to safely demo ankle strategy and reverse step strategy post posterior perturbation  Pt does cont to require CGA to Shanelle post anterior and alteral pertubation due to limited compensatory strategies.     Treatment     Mallory received the treatments listed below:      neuromuscular re-education activities to improve: Balance, Coordination, Kinesthetic, Sense, Proprioception, Posture and canalith repositioning for 58 minutes. The following activities were included:  Standing x1 eye ex 30" 3x ea quick and small/larger ROM and slower  Standing vor cancellation 1x10 and lateral with step out   Heel rocks with stab 1min   6Standing narrow KE stance with head turns horiz and vertical 20x eyes open and closed 10x  Amb " "with quick head turns 2 laps  Amb with slow head turn full ROM 1 lap  Amb 3 step seq 2 laps  everse stepping for fall prevention loaded with 10# 2 laps  Forward step with fall prevention EO/EC with perturbations   EC static 30"  EC with small perturbations by PT for reverse step strategy  Standing head motion 1x10 ea way 7/10 dizziness  Jumps and hops holding bar 2x5 ea  Layered tasks: WS fwd with CL fwd tap, and squat to floor 1x10  Wide squat eyes on 10# ball and stand 10x      Patient Education and Home Exercises     Home Exercises Provided and Patient Education Provided     Education provided:   - yes: saccades and smooth pursuit    Written Home Exercises Provided: Patient instructed to cont prior HEP. Exercises were reviewed and Mallory was able to demonstrate them prior to the end of the session.  Mallory demonstrated good  understanding of the education provided. See EMR under Patient Instructions for exercises provided during therapy sessions    ASSESSMENT     Pt progressing well. She was able to advance to layering tasks with weight shift for dynamic balance. Pt instructed in hopping and small self paced in place jumps with hands on bar for support. Pt reports no increase in s/s. Pt Independent with vestibular training exercises and ready for d/c.   Mallory Is progressing well towards her goals.   Pt prognosis is Good.     Pt will continue to benefit from skilled outpatient physical therapy to address the deficits listed in the problem list box on initial evaluation, provide pt/family education and to maximize pt's level of independence in the home and community environment.     Pt's spiritual, cultural and educational needs considered and pt agreeable to plan of care and goals.     Anticipated barriers to physical therapy: none    Goals:    Goals: Reviewed:8/12/2022    Short Term Goals: In 6 weeks   1.Pt to be educated on HEP. MET  2. Pt to be able to perform bed mobility, such as rolling over in bed without " increase in s/s. MET  3.Patient to have dizziness less than 7/10 at worst, in order to improve QOL.MET  4.Patient to improve score on the FOTO, in order to improve QOL. MET        Long Term Goals: In 10 weeks  1. Patient to improve score on the FOTO to 40% or less, in order to improve QOL.PROGRESSING  2. Patient to perform daily activities including bending to the floor and reaching to a high cabinet in order to perform household chores without increased symptoms.PROGRESSING  3. Patient to have dizziness less than 4/10 at worst, in order to improve QOL.PROGRESSING     PLAN      Plan of care Certification: 9/26/2022 to 12/2/2022     Outpatient Physical Therapy 2 times weekly for 10 weeks to include the following interventions: Cervical/Lumbar Traction, Electrical Stimulation cerviccal, Gait Training, Manual Therapy, Moist Heat/ Ice, Neuromuscular Re-ed, Orthotic Management and Training, Patient Education, Self Care, Therapeutic Activities, Therapeutic Exercise and canalith repositoning.                Alexus Zimmerman, PT

## 2022-10-05 NOTE — PLAN OF CARE
[facility]  PT Discharge Note    Name: Mallory Castro  [facility] Number: [id number]    Therapy Diagnosis:   Encounter Diagnosis   Name Primary?    Dizziness Yes     Physician: Olivier Urbano MD    Physician Orders: PT Eval and Treat   Medical Diagnosis from Referral: Dizziness  Evaluation Date: 8/12/2022    Date of Last visit: 10/5/2022  Total Visits Received: 12    ASSESSMENT      Pt progressing well. She was able to advance to layering tasks with weight shift for dynamic balance. Pt instructed in hopping and small self paced in place jumps with hands on bar for support. Pt reports no increase in s/s. Pt Independent with vestibular training exercises and ready for d/c.    Discharge reason: Patient has reached the maximum rehab potential for the present time    Discharge FOTO Score: 20%    Goals: Long Term Goals: In 10 weeks  1. Patient to improve score on the FOTO to 40% or less, in order to improve QOL.PROGRESSING  2. Patient to perform daily activities including bending to the floor and reaching to a high cabinet in order to perform household chores without increased symptoms.PROGRESSING  3. Patient to have dizziness less than 4/10 at worst, in order to improve QOL.PROGRESSING    PLAN   This patient is discharged from Physical Therapy      Alexus Zimmerman, PT

## 2023-09-30 ENCOUNTER — HOSPITAL ENCOUNTER (EMERGENCY)
Facility: HOSPITAL | Age: 78
Discharge: HOME | End: 2023-09-30
Attending: EMERGENCY MEDICINE
Payer: MEDICARE

## 2023-09-30 VITALS
HEART RATE: 54 BPM | DIASTOLIC BLOOD PRESSURE: 64 MMHG | OXYGEN SATURATION: 97 % | BODY MASS INDEX: 29.73 KG/M2 | WEIGHT: 185 LBS | RESPIRATION RATE: 25 BRPM | SYSTOLIC BLOOD PRESSURE: 151 MMHG | TEMPERATURE: 98 F | HEIGHT: 66 IN

## 2023-09-30 DIAGNOSIS — R42 DIZZINESS: ICD-10-CM

## 2023-09-30 DIAGNOSIS — R42 VERTIGO: Primary | ICD-10-CM

## 2023-09-30 LAB
ALBUMIN SERPL BCP-MCNC: 4.4 G/DL (ref 3.5–5.2)
ALP SERPL-CCNC: 71 U/L (ref 55–135)
ALT SERPL W/O P-5'-P-CCNC: 28 U/L (ref 10–44)
ANION GAP SERPL CALC-SCNC: 12 MMOL/L (ref 8–16)
AST SERPL-CCNC: 30 U/L (ref 10–40)
BASOPHILS # BLD AUTO: 0.04 K/UL (ref 0–0.2)
BASOPHILS NFR BLD: 0.4 % (ref 0–1.9)
BILIRUB SERPL-MCNC: 0.9 MG/DL (ref 0.1–1)
BILIRUB UR QL STRIP: NEGATIVE
BNP SERPL-MCNC: 117 PG/ML (ref 0–99)
BUN SERPL-MCNC: 20 MG/DL (ref 8–23)
CALCIUM SERPL-MCNC: 9.8 MG/DL (ref 8.7–10.5)
CHLORIDE SERPL-SCNC: 105 MMOL/L (ref 95–110)
CLARITY UR: CLEAR
CO2 SERPL-SCNC: 24 MMOL/L (ref 23–29)
COLOR UR: YELLOW
CREAT SERPL-MCNC: 1 MG/DL (ref 0.5–1.4)
DIFFERENTIAL METHOD: ABNORMAL
EOSINOPHIL # BLD AUTO: 0.1 K/UL (ref 0–0.5)
EOSINOPHIL NFR BLD: 1 % (ref 0–8)
ERYTHROCYTE [DISTWIDTH] IN BLOOD BY AUTOMATED COUNT: 12.1 % (ref 11.5–14.5)
EST. GFR  (NO RACE VARIABLE): 59 ML/MIN/1.73 M^2
GLUCOSE SERPL-MCNC: 130 MG/DL (ref 70–110)
GLUCOSE UR QL STRIP: NEGATIVE
HCT VFR BLD AUTO: 41.6 % (ref 37–48.5)
HGB BLD-MCNC: 14.3 G/DL (ref 12–16)
HGB UR QL STRIP: NEGATIVE
IMM GRANULOCYTES # BLD AUTO: 0.04 K/UL (ref 0–0.04)
IMM GRANULOCYTES NFR BLD AUTO: 0.4 % (ref 0–0.5)
KETONES UR QL STRIP: ABNORMAL
LEUKOCYTE ESTERASE UR QL STRIP: NEGATIVE
LYMPHOCYTES # BLD AUTO: 1.3 K/UL (ref 1–4.8)
LYMPHOCYTES NFR BLD: 13.1 % (ref 18–48)
MCH RBC QN AUTO: 31.2 PG (ref 27–31)
MCHC RBC AUTO-ENTMCNC: 34.4 G/DL (ref 32–36)
MCV RBC AUTO: 91 FL (ref 82–98)
MONOCYTES # BLD AUTO: 0.6 K/UL (ref 0.3–1)
MONOCYTES NFR BLD: 5.5 % (ref 4–15)
NEUTROPHILS # BLD AUTO: 8.1 K/UL (ref 1.8–7.7)
NEUTROPHILS NFR BLD: 79.6 % (ref 38–73)
NITRITE UR QL STRIP: NEGATIVE
NRBC BLD-RTO: 0 /100 WBC
PH UR STRIP: 6 [PH] (ref 5–8)
PLATELET # BLD AUTO: 198 K/UL (ref 150–450)
PMV BLD AUTO: 10.7 FL (ref 9.2–12.9)
POTASSIUM SERPL-SCNC: 3.6 MMOL/L (ref 3.5–5.1)
PROT SERPL-MCNC: 7.4 G/DL (ref 6–8.4)
PROT UR QL STRIP: NEGATIVE
RBC # BLD AUTO: 4.59 M/UL (ref 4–5.4)
SODIUM SERPL-SCNC: 141 MMOL/L (ref 136–145)
SP GR UR STRIP: 1.01 (ref 1–1.03)
TROPONIN I SERPL DL<=0.01 NG/ML-MCNC: 0.01 NG/ML (ref 0–0.03)
URN SPEC COLLECT METH UR: ABNORMAL
UROBILINOGEN UR STRIP-ACNC: NEGATIVE EU/DL
WBC # BLD AUTO: 10.11 K/UL (ref 3.9–12.7)

## 2023-09-30 PROCEDURE — 96361 HYDRATE IV INFUSION ADD-ON: CPT

## 2023-09-30 PROCEDURE — 83880 ASSAY OF NATRIURETIC PEPTIDE: CPT | Performed by: EMERGENCY MEDICINE

## 2023-09-30 PROCEDURE — 93010 ELECTROCARDIOGRAM REPORT: CPT | Mod: ,,, | Performed by: INTERNAL MEDICINE

## 2023-09-30 PROCEDURE — 63600175 PHARM REV CODE 636 W HCPCS: Performed by: EMERGENCY MEDICINE

## 2023-09-30 PROCEDURE — 93010 EKG 12-LEAD: ICD-10-PCS | Mod: ,,, | Performed by: INTERNAL MEDICINE

## 2023-09-30 PROCEDURE — 85025 COMPLETE CBC W/AUTO DIFF WBC: CPT | Performed by: EMERGENCY MEDICINE

## 2023-09-30 PROCEDURE — 80053 COMPREHEN METABOLIC PANEL: CPT | Performed by: EMERGENCY MEDICINE

## 2023-09-30 PROCEDURE — 84484 ASSAY OF TROPONIN QUANT: CPT | Performed by: EMERGENCY MEDICINE

## 2023-09-30 PROCEDURE — 81003 URINALYSIS AUTO W/O SCOPE: CPT | Performed by: EMERGENCY MEDICINE

## 2023-09-30 PROCEDURE — 93005 ELECTROCARDIOGRAM TRACING: CPT

## 2023-09-30 PROCEDURE — 96374 THER/PROPH/DIAG INJ IV PUSH: CPT

## 2023-09-30 PROCEDURE — 25000003 PHARM REV CODE 250: Performed by: EMERGENCY MEDICINE

## 2023-09-30 PROCEDURE — 99285 EMERGENCY DEPT VISIT HI MDM: CPT | Mod: 25

## 2023-09-30 RX ORDER — ONDANSETRON 4 MG/1
4 TABLET, ORALLY DISINTEGRATING ORAL EVERY 6 HOURS PRN
Qty: 30 TABLET | Refills: 0 | Status: SHIPPED | OUTPATIENT
Start: 2023-09-30

## 2023-09-30 RX ORDER — MECLIZINE HYDROCHLORIDE 25 MG/1
50 TABLET ORAL
Status: COMPLETED | OUTPATIENT
Start: 2023-09-30 | End: 2023-09-30

## 2023-09-30 RX ORDER — ONDANSETRON 2 MG/ML
4 INJECTION INTRAMUSCULAR; INTRAVENOUS
Status: COMPLETED | OUTPATIENT
Start: 2023-09-30 | End: 2023-09-30

## 2023-09-30 RX ORDER — MECLIZINE HYDROCHLORIDE 25 MG/1
25 TABLET ORAL 3 TIMES DAILY PRN
Qty: 20 TABLET | Refills: 0 | Status: SHIPPED | OUTPATIENT
Start: 2023-09-30

## 2023-09-30 RX ADMIN — MECLIZINE HYDROCHLORIDE 50 MG: 25 TABLET ORAL at 09:09

## 2023-09-30 RX ADMIN — ONDANSETRON 4 MG: 2 INJECTION INTRAMUSCULAR; INTRAVENOUS at 08:09

## 2023-09-30 RX ADMIN — SODIUM CHLORIDE 1000 ML: 9 INJECTION, SOLUTION INTRAVENOUS at 09:09

## 2023-10-01 NOTE — ED PROVIDER NOTES
SCRIBE #1 NOTE: I, Braxton Corado, am scribing for, and in the presence of, Julio Cesar Melara Jr., MD. I have scribed the entire note.       History     Chief Complaint   Patient presents with    Dizziness     That started yesterday afternoon. +N/V. Hx of vertigo.      Review of patient's allergies indicates:   Allergen Reactions    Coconut     Codeine phosphate     Amitriptyline Anxiety     Other reaction(s): nightmares  nightmares    Codeine Nausea And Vomiting     Other reaction(s): Stomach upset  Other reaction(s): Vomiting  Other reaction(s): Stomach upset      Prednisone Anxiety     Other reaction(s): mental unstability  Keeps awake for days          History of Present Illness     HPI    9/30/2023, 8:22 PM  History obtained from the patient      History of Present Illness: Mallory Castro is a 77 y.o. female patient with a PMHx of anxiety, arthritis, cataracts, endometriosis, HLD, HTN, hypothyroidism, neuromuscular disorder, sepsis, thyroid disease, and vertigo who presents to the Emergency Department for evaluation of vertigo which onset yesterday. 2 days ago, pt reports not feeling well. She woke up from a nap yesterday and started having vertigo, feeling as if the room were spinning. Pt presented to the ED after calling her PCP who told her that she most likely needs a CT. She has had vertigo before for which she went to physical therapy, but she does not think that she was fully recovered whenever she stopped going. She is experiencing HA now which she says is different than prior episodes. She notes that the sxs are worse whenever she goes from laying down to sitting up and moving. Additional associated sxs include N/V. Patient denies any pre-syncope, numbness, paresthesia, dysuria, hematuria, hematochezia, cough, congestion, and all other sxs at this time. No further complaints or concerns at this time.       Arrival mode: Personal vehicle     PCP: Edwina Teran MD        Past Medical History:  Past  Medical History:   Diagnosis Date    Allergy     Anxiety     Arthritis     Cataract     Dry eyes     Dry mouth     Endometriosis     Family history of breast cancer 02/19/2016    Fibromyalgia 2000    Genetic testing 06/08/2017    BRAC NEGATIVE  TCRA 6.6 % if menopausal at 44 8.1% if at 53 yo.    Herpes     Hyperlipidemia     Hypertension     Hypothyroidism     Neuromuscular disorder     Osteoarthritis 2003    Osteoporosis     Sepsis 2012    Thyroid disease        Past Surgical History:  Past Surgical History:   Procedure Laterality Date    APPENDECTOMY      BREAST CYST ASPIRATION Left 1978    BUNIONECTOMY Right 2007    CATARACT EXTRACTION, BILATERAL      CHOLECYSTECTOMY      COLONOSCOPY      2004, 06/2016- [facility].    FOOT SURGERY      HAND TENDON SURGERY  2014    HYSTERECTOMY  1987    [facility]   Fibroid and trauma kicked by a horse and severe pain    JOINT REPLACEMENT  08/2013    right knee replacement and LEFT    left ankle surgery      tendons tightened     SHOULDER SURGERY      SINUS SURGERY  1978    deviated septum and sinus    TOTAL HIP ARTHROPLASTY      WRIST SURGERY  2006    trapezium both right and left          Family History:  Family History   Problem Relation Age of Onset    Breast cancer Mother 45    Hypertension Mother     Hypertension Father     Skin cancer Father     Cancer Brother        Social History:  Social History     Tobacco Use    Smoking status: Never    Smokeless tobacco: Never   Substance and Sexual Activity    Alcohol use: Never    Drug use: Not Currently    Sexual activity: Yes     Partners: Male        Review of Systems     Review of Systems   Constitutional:  Negative for fever.        (+) Malaise   HENT:  Negative for congestion and sore throat.    Respiratory:  Negative for cough and shortness of breath.    Cardiovascular:  Negative for chest pain.   Gastrointestinal:  Positive for nausea and vomiting. Negative for blood in stool.   Genitourinary:  Negative for dysuria and hematuria.  "  Musculoskeletal:  Negative for back pain.   Skin:  Negative for rash.   Neurological:  Positive for dizziness and headaches. Negative for weakness and numbness.        (-) Pre-syncope  (-) Paresthesia   Hematological:  Does not bruise/bleed easily.   All other systems reviewed and are negative.       Physical Exam     Initial Vitals [09/30/23 1901]   BP Pulse Resp Temp SpO2   (!) 193/96 70 18 98.2 °F (36.8 °C) 95 %      MAP       --          Physical Exam  Nursing Notes and Vital Signs Reviewed.  Constitutional: Patient is in no acute distress. Well-developed and well-nourished.  Head: Atraumatic. Normocephalic.  Eyes: PERRL. EOM intact. Conjunctivae are not pale. No scleral icterus.  ENT: Mucous membranes are moist. Oropharynx is clear and symmetric.    Neck: Supple. Full ROM. No lymphadenopathy.  Cardiovascular: Regular rate. Regular rhythm. No murmurs, rubs, or gallops. Distal pulses are 2+ and symmetric.  Pulmonary/Chest: No respiratory distress. Clear to auscultation bilaterally. No wheezing or rales.  Abdominal: Soft and non-distended.  There is no tenderness.  No rebound, guarding, or rigidity. Good bowel sounds.  Genitourinary: No CVA tenderness  Musculoskeletal: Moves all extremities. No obvious deformities. No edema. No calf tenderness.  Skin: Warm and dry.  Neurological:  Alert, awake, and appropriate.  Normal speech. Sensation of room spinning when going from lying down to sitting up. No acute focal neurological deficits are appreciated. GCS: 15. Cranial nerves intact.  Psychiatric: Normal affect. Good eye contact. Appropriate in content.     ED Course   Procedures  ED Vital Signs:  Vitals:    09/30/23 1901 09/30/23 1917 09/30/23 2105 09/30/23 2200   BP: (!) 193/96 (!) 185/86 (!) 147/65 (!) 149/75   Pulse: 70 60 (!) 54 (!) 51   Resp: 18 20 16 (!) 22   Temp: 98.2 °F (36.8 °C)      TempSrc: Oral      SpO2: 95% 97% 97% 96%   Weight: 83.9 kg (185 lb)      Height: 5' 6" (1.676 m)       09/30/23 2230 " 09/30/23 2330 09/30/23 2350   BP: (!) 149/67 (!) 158/68 (!) 151/64   Pulse: (!) 53 (!) 54 (!) 54   Resp: (!) 21 20 (!) 25   Temp:   98.2 °F (36.8 °C)   TempSrc:   Oral   SpO2: 97% 96% 97%   Weight:      Height:          Abnormal Lab Results:  Labs Reviewed   CBC W/ AUTO DIFFERENTIAL - Abnormal; Notable for the following components:       Result Value    MCH 31.2 (*)     Gran # (ANC) 8.1 (*)     Gran % 79.6 (*)     Lymph % 13.1 (*)     All other components within normal limits   COMPREHENSIVE METABOLIC PANEL - Abnormal; Notable for the following components:    Glucose 130 (*)     eGFR 59 (*)     All other components within normal limits   B-TYPE NATRIURETIC PEPTIDE - Abnormal; Notable for the following components:     (*)     All other components within normal limits   URINALYSIS, REFLEX TO URINE CULTURE - Abnormal; Notable for the following components:    Ketones, UA Trace (*)     All other components within normal limits    Narrative:     Specimen Source->Urine   TROPONIN I        All Lab Results:  Results for orders placed or performed during the hospital encounter of 09/30/23   CBC auto differential   Result Value Ref Range    WBC 10.11 3.90 - 12.70 K/uL    RBC 4.59 4.00 - 5.40 M/uL    Hemoglobin 14.3 12.0 - 16.0 g/dL    Hematocrit 41.6 37.0 - 48.5 %    MCV 91 82 - 98 fL    MCH 31.2 (H) 27.0 - 31.0 pg    MCHC 34.4 32.0 - 36.0 g/dL    RDW 12.1 11.5 - 14.5 %    Platelets 198 150 - 450 K/uL    MPV 10.7 9.2 - 12.9 fL    Immature Granulocytes 0.4 0.0 - 0.5 %    Gran # (ANC) 8.1 (H) 1.8 - 7.7 K/uL    Immature Grans (Abs) 0.04 0.00 - 0.04 K/uL    Lymph # 1.3 1.0 - 4.8 K/uL    Mono # 0.6 0.3 - 1.0 K/uL    Eos # 0.1 0.0 - 0.5 K/uL    Baso # 0.04 0.00 - 0.20 K/uL    nRBC 0 0 /100 WBC    Gran % 79.6 (H) 38.0 - 73.0 %    Lymph % 13.1 (L) 18.0 - 48.0 %    Mono % 5.5 4.0 - 15.0 %    Eosinophil % 1.0 0.0 - 8.0 %    Basophil % 0.4 0.0 - 1.9 %    Differential Method Automated    Comprehensive metabolic panel   Result Value  Ref Range    Sodium 141 136 - 145 mmol/L    Potassium 3.6 3.5 - 5.1 mmol/L    Chloride 105 95 - 110 mmol/L    CO2 24 23 - 29 mmol/L    Glucose 130 (H) 70 - 110 mg/dL    BUN 20 8 - 23 mg/dL    Creatinine 1.0 0.5 - 1.4 mg/dL    Calcium 9.8 8.7 - 10.5 mg/dL    Total Protein 7.4 6.0 - 8.4 g/dL    Albumin 4.4 3.5 - 5.2 g/dL    Total Bilirubin 0.9 0.1 - 1.0 mg/dL    Alkaline Phosphatase 71 55 - 135 U/L    AST 30 10 - 40 U/L    ALT 28 10 - 44 U/L    eGFR 59 (A) >60 mL/min/1.73 m^2    Anion Gap 12 8 - 16 mmol/L   Troponin I #1   Result Value Ref Range    Troponin I 0.008 0.000 - 0.026 ng/mL   BNP   Result Value Ref Range     (H) 0 - 99 pg/mL   Urinalysis, Reflex to Urine Culture Urine, Clean Catch    Specimen: Urine   Result Value Ref Range    Specimen UA Urine, Clean Catch     Color, UA Yellow Yellow, Straw, Neda    Appearance, UA Clear Clear    pH, UA 6.0 5.0 - 8.0    Specific Gravity, UA 1.015 1.005 - 1.030    Protein, UA Negative Negative    Glucose, UA Negative Negative    Ketones, UA Trace (A) Negative    Bilirubin (UA) Negative Negative    Occult Blood UA Negative Negative    Nitrite, UA Negative Negative    Urobilinogen, UA Negative <2.0 EU/dL    Leukocytes, UA Negative Negative        Imaging Results:  Imaging Results              CT Head Without Contrast (Final result)  Result time 09/30/23 21:10:20      Final result by Paul Winn MD (09/30/23 21:10:20)                   Impression:        No intracranial hemorrhage mass effect or midline shift    Atrophy and chronic white matter changes.      All CT scans at [this location] are performed using dose modulation techniques as appropriate to a performed exam including the following: automated exposure control; adjustment of the mA and/or kV according to patient size (this includes techniques or standardized protocols for targeted exams where dose is matched to indication / reason for exam; i.e. extremities or head); use of iterative reconstruction  technique.          Finalized on: 9/30/2023 9:10 PM By:  Paul Winn MD, JD PhD  [facility]# [id number]      2023-09-30 21:12:28.968    [facility]               Narrative:    EXAM:  CT HEAD WITHOUT CONTRAST    CLINICAL HISTORY: Pain    TECHNIQUE:  5-mm axial images were performed through the head without intravenous contrast.    COMPARISON: None    FINDINGS:      No hydrocephalus, midline shift, mass effect, or acute intracranial hemorrhage.    Atrophy and chronic white matter changes.    No extra-axial fluid or hemorrhage.      Posterior fossa is unremarkable.    The skull and orbits are intact.    The visualized paranasal sinuses and mastoid air cells are clear.                                         X-Ray Chest AP Portable (Final result)  Result time 09/30/23 21:10:47      Final result by Paul Winn MD (09/30/23 21:10:47)                   Impression:        Negative single view chest radiograph.      Finalized on: 9/30/2023 9:10 PM By:  Paul Winn MD, JD PhD  [facility]# [id number]      2023-09-30 21:12:58.989    [facility]               Narrative:    EXAM:   XR CHEST AP PORTABLE    CLINICAL HISTORY: Pain    COMPARISON: None.    TECHNIQUE:PA    FINDINGS:  The lungs are clear.   No pneumothorax.  The cardiac silhouette size and contour is within normal limits.    Left shoulder prosthesis                                         The EKG was ordered, reviewed, and independently interpreted by the ED provider.  Interpretation time: 21:07  Rate: 52 BPM  Rhythm: sinus bradycardia  Interpretation: Low voltage QRS. No STEMI.             The Emergency Provider reviewed the vital signs and test results, which are outlined above.     ED Discussion       11:41 PM: Reassessed pt at this time. Told to f/u with PCP and return if any issues arise. Discussed with pt all pertinent ED information and results. Discussed pt dx and plan of tx. Gave pt all f/u and return to the ED instructions. All questions and concerns were addressed at this time. Pt  expresses understanding of information and instructions, and is comfortable with plan to discharge. Pt is stable for discharge.    I discussed with patient and/or family/caretaker that evaluation in the ED does not suggest any emergent or life threatening medical conditions requiring immediate intervention beyond what was provided in the ED, and I believe patient is safe for discharge.  Regardless, an unremarkable evaluation in the ED does not preclude the development or presence of a serious of life threatening condition. As such, patient was instructed to return immediately for any worsening or change in current symptoms.    Regarding VERTIGO & DIZZINESS, I recommended strategies to prevent or manage symptoms such as: avoiding sudden head movements; refraining from bending over at the waist; keeping head raised when lying down (i,e., place pillows under upper back and head or rest in a recliner); avoid staying in bed for long periods of time or if bedrest is required, change position often when in bed; and always wear a helmet when riding bikes or playing sports. Instructed patient to contact primary care provider if symptoms worsen, falls become more frequent, or any questions arise regarding condition and/or treatment.  Patient instructed to return to the emergency department if: a headache develops and becomes persistent and severe: notice any increase in weakness or visual changes; experience any shortness of breath or chest pain; and/or develop hearing problems or tinnitus.     Driving or other activities under influence of medications - Patient and/or family/caretaker was given a prescription for, or instructed to use a medicine that may impair ability to drive, operate machinery, or participate in other potentially dangerous activities.  Patient was instructed not to participate in these activities while under the influence of these medications.       Medical Decision Making  Amount and/or Complexity of  Data Reviewed  Labs: ordered. Decision-making details documented in ED Course.  Radiology: ordered and independent interpretation performed. Decision-making details documented in ED Course.  ECG/medicine tests: ordered and independent interpretation performed. Decision-making details documented in ED Course.    Risk  Prescription drug management.                ED Medication(s):  Medications   sodium chloride 0.9% bolus 1,000 mL 1,000 mL (0 mLs Intravenous Stopped 9/30/23 2202)   ondansetron injection 4 mg (4 mg Intravenous Given 9/30/23 2059)   meclizine tablet 50 mg (50 mg Oral Given 9/30/23 2101)       Discharge Medication List as of 9/30/2023 11:41 PM        START taking these medications    Details   meclizine (ANTIVERT) 25 mg tablet Take 1 tablet (25 mg total) by mouth 3 (three) times daily as needed., Starting Sat 9/30/2023, Print      ondansetron (ZOFRAN-ODT) 4 MG TbDL Take 1 tablet (4 mg total) by mouth every 6 (six) hours as needed., Starting Sat 9/30/2023, Print              Follow-up Information       Schedule an appointment as soon as possible for a visit  with Edwina Teran MD.    Specialty: Internal Medicine  Contact information:  [address] [facility] [place] [facility] [address] [phone]               [facility] ENT Surgery. Schedule an appointment as soon as possible for a visit in 1 week.    Specialty: Otolaryngology  Contact information:  [address] [facility] [address] [phone]             [facility] - Emergency Dept..    Specialty: Emergency Medicine  Why: As needed, If symptoms worsen  Contact information:  [address] [facility] [address] [phone]                               Scribe Attestation:   Scribe #1: I performed the above scribed service and the documentation accurately describes the services I performed. I attest to the accuracy of the note.     Attending:   Physician Attestation Statement for Scribe #1: Kelton DAY  Julio Cesar Armijo MD, personally performed the services described in this documentation, as scribed by Braxton Corado, in my presence, and it is both accurate and complete.           Clinical Impression       ICD-10-CM ICD-9-CM   1. Vertigo  R42 780.4   2. Dizziness  R42 780.4       Disposition:   Disposition: Discharged  Condition: Stable         Julio Cesar Melara Jr., MD  10/01/23 0510